# Patient Record
Sex: FEMALE | Race: WHITE | NOT HISPANIC OR LATINO | URBAN - METROPOLITAN AREA
[De-identification: names, ages, dates, MRNs, and addresses within clinical notes are randomized per-mention and may not be internally consistent; named-entity substitution may affect disease eponyms.]

---

## 2021-12-28 ENCOUNTER — EMERGENCY (EMERGENCY)
Facility: HOSPITAL | Age: 26
LOS: 1 days | Discharge: HOME | End: 2021-12-28
Payer: MEDICARE

## 2021-12-28 VITALS
HEART RATE: 108 BPM | DIASTOLIC BLOOD PRESSURE: 98 MMHG | TEMPERATURE: 100 F | HEIGHT: 66 IN | OXYGEN SATURATION: 99 % | WEIGHT: 151.9 LBS | RESPIRATION RATE: 22 BRPM | SYSTOLIC BLOOD PRESSURE: 148 MMHG

## 2021-12-28 LAB — SARS-COV-2 RNA SPEC QL NAA+PROBE: DETECTED

## 2021-12-28 PROCEDURE — U0003: CPT

## 2021-12-28 PROCEDURE — 99283 EMERGENCY DEPT VISIT LOW MDM: CPT

## 2021-12-28 PROCEDURE — U0005: CPT

## 2021-12-28 NOTE — ED PROVIDER NOTE - NS ED ROS FT
Const: no fever , no chills + body aches   Eyes: No redness, no discharge  ENT: no throat pain, no congestion  Cardiac: No chest pain  Resp: + cough, no sob  GI: no abd pain, no n/v/d  : no dysuria   Skin: No rash  Neuro: + HA

## 2021-12-28 NOTE — ED PROVIDER NOTE - CLINICAL SUMMARY MEDICAL DECISION MAKING FREE TEXT BOX
Well appearing presenting with viral like illness. Will send covid testing. suportive care. ED return precautions discussed.     PT COVID (and/or RVP) swab(s) obtained.  Advised home quarantine until test results available.  If test positive, requires home quarantine. Anticipatory guidance provided and supportive care recommended. Advised immediate return if worsening symptoms, including and not limited to sever chest pain, worsening shortness of breath, fever not reduced with OTC antipyretic use, inability to tolerate food or liquid. PT provided with written COVID discharge instructions and instructions on how to obtain COVID results. Advised to contact PMD within 24 hoiurs.

## 2021-12-28 NOTE — ED PROVIDER NOTE - PATIENT PORTAL LINK FT
You can access the FollowMyHealth Patient Portal offered by [facility] by registering at the following website: [email or web address]. By joining [employer]’s FollowMyHealth portal, you will also be able to view your health information using other applications (apps) compatible with our system.